# Patient Record
Sex: FEMALE | Race: WHITE
[De-identification: names, ages, dates, MRNs, and addresses within clinical notes are randomized per-mention and may not be internally consistent; named-entity substitution may affect disease eponyms.]

---

## 2019-04-27 ENCOUNTER — HOSPITAL ENCOUNTER (EMERGENCY)
Dept: HOSPITAL 74 - JER | Age: 24
Discharge: HOME | End: 2019-04-27
Payer: SELF-PAY

## 2019-04-27 VITALS — SYSTOLIC BLOOD PRESSURE: 125 MMHG | TEMPERATURE: 97.7 F | DIASTOLIC BLOOD PRESSURE: 76 MMHG | HEART RATE: 63 BPM

## 2019-04-27 VITALS — BODY MASS INDEX: 18.1 KG/M2

## 2019-04-27 DIAGNOSIS — O26.891: Primary | ICD-10-CM

## 2019-04-27 DIAGNOSIS — Z3A.01: ICD-10-CM

## 2019-04-27 DIAGNOSIS — O20.0: ICD-10-CM

## 2019-04-27 LAB
ANION GAP SERPL CALC-SCNC: 6 MMOL/L (ref 8–16)
APPEARANCE UR: CLEAR
BACTERIA #/AREA URNS HPF: 30.3 /HPF
BASOPHILS # BLD: 0.5 % (ref 0–2)
BILIRUB UR STRIP.AUTO-MCNC: NEGATIVE MG/DL
BUN SERPL-MCNC: 9 MG/DL (ref 7–18)
CALCIUM SERPL-MCNC: 8.9 MG/DL (ref 8.5–10.1)
CASTS #/AREA URNS LPF: 1 /LPF (ref 0–8)
CHLORIDE SERPL-SCNC: 107 MMOL/L (ref 98–107)
CO2 SERPL-SCNC: 27 MMOL/L (ref 21–32)
COLOR UR: YELLOW
CREAT SERPL-MCNC: 0.4 MG/DL (ref 0.55–1.3)
DEPRECATED RDW RBC AUTO: 12.5 % (ref 11.6–15.6)
EOSINOPHIL # BLD: 3 % (ref 0–4.5)
EPITH CASTS URNS QL MICRO: 1.3 /HPF
GLUCOSE SERPL-MCNC: 91 MG/DL (ref 74–106)
HCT VFR BLD CALC: 40.1 % (ref 32.4–45.2)
HGB BLD-MCNC: 13.9 GM/DL (ref 10.7–15.3)
KETONES UR QL STRIP: NEGATIVE
LEUKOCYTE ESTERASE UR QL STRIP.AUTO: NEGATIVE
LYMPHOCYTES # BLD: 22.1 % (ref 8–40)
MCH RBC QN AUTO: 33.3 PG (ref 25.7–33.7)
MCHC RBC AUTO-ENTMCNC: 34.7 G/DL (ref 32–36)
MCV RBC: 96.1 FL (ref 80–96)
MONOCYTES # BLD AUTO: 5.5 % (ref 3.8–10.2)
NEUTROPHILS # BLD: 68.9 % (ref 42.8–82.8)
NITRITE UR QL STRIP: NEGATIVE
PH UR: 5 [PH] (ref 5–8)
PLATELET # BLD AUTO: 177 K/MM3 (ref 134–434)
PMV BLD: 9.9 FL (ref 7.5–11.1)
POTASSIUM SERPLBLD-SCNC: 3.6 MMOL/L (ref 3.5–5.1)
PROT UR QL STRIP: NEGATIVE
PROT UR QL STRIP: NEGATIVE
RBC # BLD AUTO: 19 /HPF (ref 0–4)
RBC # BLD AUTO: 4.17 M/MM3 (ref 3.6–5.2)
SODIUM SERPL-SCNC: 140 MMOL/L (ref 136–145)
SP GR UR: 1.02 (ref 1.01–1.03)
UROBILINOGEN UR STRIP-MCNC: 0.2 MG/DL (ref 0.2–1)
WBC # BLD AUTO: 9.5 K/MM3 (ref 4–10)
WBC # UR AUTO: 1 /HPF (ref 0–5)

## 2019-04-27 NOTE — PDOC
History of Present Illness





- General


Chief Complaint: Vaginal Bleeding


Stated Complaint: VAGINAL BLEEDING


Time Seen by Provider: 19 14:21


History Source: Patient


Exam Limitations: No Limitations





Past History





- Past Medical History


Allergies/Adverse Reactions: 


 Allergies











Allergy/AdvReac Type Severity Reaction Status Date / Time


 


No Known Allergies Allergy   Verified 19 15:07











Home Medications: 


Ambulatory Orders





NK [No Known Home Medication]  19 











- Suicide/Smoking/Psychosocial Hx


Smoking History: Unknown if ever smoked


Hx Alcohol Use: No


Drug/Substance Use Hx: No





*Physical Exam





- Vital Signs


 Last Vital Signs











Temp Pulse Resp BP Pulse Ox


 


 97.7 F   63   20   125/76   100 


 


 19 13:38  19 13:38  19 13:38  19 13:38  19 13:38














- Physical Exam


General Appearance: No: Apparent Distress


Respiratory/Chest: positive: Lungs Clear, Normal Breath Sounds.  negative: 

Respiratory Distress


Cardiovascular: positive: Regular Rhythm, Regular Rate, S1, S2.  negative: 

Murmur


Female Pelvic Exam: positive: vaginal bleeding (blood in vault), other (

cervical os closed)


Gastrointestinal/Abdominal: positive: Normal Bowel Sounds, Soft.  negative: 

Tender, Distended, Guarding, Rebound


Musculoskeletal: negative: CVA Tenderness


Integumentary: positive: Normal Color


Neurologic: positive: Alert, Normal Mood/Affect





ED Treatment Course





- LABORATORY


CBC & Chemistry Diagram: 


 19 15:20





 19 15:20





- ADDITIONAL ORDERS


Additional order review: 


 Laboratory  Results











  19





  15:43 15:43 15:20


 


Sodium    140


 


Potassium    3.6


 


Chloride    107


 


Carbon Dioxide    27


 


Anion Gap    6 L


 


BUN    9


 


Creatinine    0.4 L


 


Creat Clearance w eGFR    201.49


 


Random Glucose    91


 


Calcium    8.9


 


Beta HCG, Quant    1893.0


 


Urine Color  Yellow  


 


Urine Appearance  Clear  


 


Urine pH  5.0  


 


Ur Specific Gravity  1.017  


 


Urine Protein  Negative  


 


Urine Glucose (UA)  Negative  


 


Urine Ketones  Negative  


 


Urine Blood  3+ H  


 


Urine Nitrite  Negative  


 


Urine Bilirubin  Negative  


 


Urine Urobilinogen  0.2  


 


Ur Leukocyte Esterase  Negative  


 


Urine WBC (Auto)  1  


 


Urine RBC (Auto)  19  


 


Urine Casts (Auto)  1  


 


U Epithel Cells (Auto)  1.3  


 


Urine Bacteria (Auto)  30.3  


 


Urine HCG, Qual   Positive 








 











  19





  15:20


 


RBC  4.17


 


MCV  96.1 H


 


MCHC  34.7


 


RDW  12.5


 


MPV  9.9


 


Neutrophils %  68.9


 


Lymphocytes %  22.1


 


Monocytes %  5.5


 


Eosinophils %  3.0


 


Basophils %  0.5














- RADIOLOGY


Radiology Studies Ordered: 














 Category Date Time Status


 


 TRANSVAGINAL US PREG [US] Stat Ultrasound  19 16:14 Ordered














Medical Decision Making





- Medical Decision Making








24 y/o F with no sig pmh, G0, presents with vaginal spotting from yesterday 

along with lower abdominal pain. LNMP was 3/3/19. Is concerned she may be 

pregnant; took a pregnancy test 5 days ago and states saw "2 lines." Denies 

fever, sob, cp, n/v/d, dysuria.





R/O ectopic; consider threatened 


Plan: Labs, UA, Transvaginal ultrasound





19 16:52





Pelvic ultrasound results:


The uterus is retroflexed. Gestational sac, yolk sac, and fetal pole are 

identified. A fetal heart rate


of 124 bpm was identified. Crown-rump length measurements correspond to an 

expected


gestational age of 6 weeks 3 days which is accurate to within plus or -4 days. 

These


measurements correspond to an expected date of delivery of 2019.








IUP noted


Results endorsed to patient


Stable for dc





19 19:01








*DC/Admit/Observation/Transfer


Diagnosis at time of Disposition: 


 Threatened 








- Discharge Dispostion


Disposition: HOME


Condition at time of disposition: Stable


Decision to Admit order: No





- Referrals





- Patient Instructions


Printed Discharge Instructions:  DI for Threatened 


Additional Instructions: 





Thank you for choosing [facility].  It was a pleasure taking 

care of you.  





You were found to be pregnant


Please start taking Prenatal vitamins daily


Also follow-up with gynecologist for continued prenatal care


Recommend pelvic rest (avoid heavy lifting, sexual intercourse)





Return to the Emergency Department if your symptoms worsen or persist, you have 

fever, shortness of breath, chest pain, severe abdominal pain, vomiting, heavy 

vaginal bleeding or other concerning symptoms. 


Print Language: Romanian





- Post Discharge Activity